# Patient Record
Sex: MALE | Race: WHITE | Employment: FULL TIME | URBAN - METROPOLITAN AREA
[De-identification: names, ages, dates, MRNs, and addresses within clinical notes are randomized per-mention and may not be internally consistent; named-entity substitution may affect disease eponyms.]

---

## 2019-11-19 ENCOUNTER — OFFICE VISIT (OUTPATIENT)
Dept: URGENT CARE | Facility: URGENT CARE | Age: 49
End: 2019-11-19
Payer: COMMERCIAL

## 2019-11-19 ENCOUNTER — ANCILLARY PROCEDURE (OUTPATIENT)
Dept: GENERAL RADIOLOGY | Facility: CLINIC | Age: 49
End: 2019-11-19
Attending: PREVENTIVE MEDICINE
Payer: COMMERCIAL

## 2019-11-19 ENCOUNTER — NURSE TRIAGE (OUTPATIENT)
Dept: NURSING | Facility: CLINIC | Age: 49
End: 2019-11-19

## 2019-11-19 VITALS
HEIGHT: 69 IN | WEIGHT: 175 LBS | SYSTOLIC BLOOD PRESSURE: 110 MMHG | DIASTOLIC BLOOD PRESSURE: 70 MMHG | HEART RATE: 113 BPM | BODY MASS INDEX: 25.92 KG/M2 | TEMPERATURE: 97.7 F | OXYGEN SATURATION: 96 %

## 2019-11-19 DIAGNOSIS — J10.1 INFLUENZA A: ICD-10-CM

## 2019-11-19 DIAGNOSIS — R52 ACHES: Primary | ICD-10-CM

## 2019-11-19 DIAGNOSIS — R05.9 COUGH: ICD-10-CM

## 2019-11-19 DIAGNOSIS — J18.9 PNEUMONIA DUE TO INFECTIOUS ORGANISM, UNSPECIFIED LATERALITY, UNSPECIFIED PART OF LUNG: ICD-10-CM

## 2019-11-19 LAB
FLUAV+FLUBV AG SPEC QL: NEGATIVE
FLUAV+FLUBV AG SPEC QL: POSITIVE
SPECIMEN SOURCE: ABNORMAL

## 2019-11-19 PROCEDURE — 71046 X-RAY EXAM CHEST 2 VIEWS: CPT

## 2019-11-19 PROCEDURE — 99203 OFFICE O/P NEW LOW 30 MIN: CPT

## 2019-11-19 PROCEDURE — 87804 INFLUENZA ASSAY W/OPTIC: CPT | Performed by: PREVENTIVE MEDICINE

## 2019-11-19 PROCEDURE — 87798 DETECT AGENT NOS DNA AMP: CPT | Performed by: PREVENTIVE MEDICINE

## 2019-11-19 RX ORDER — CEFPODOXIME PROXETIL 200 MG/1
200 TABLET, FILM COATED ORAL 2 TIMES DAILY
Qty: 14 TABLET | Refills: 0 | Status: SHIPPED | OUTPATIENT
Start: 2019-11-19 | End: 2019-11-26

## 2019-11-19 RX ORDER — ACETAMINOPHEN 325 MG/1
325-650 TABLET ORAL EVERY 6 HOURS PRN
COMMUNITY

## 2019-11-19 RX ORDER — IBUPROFEN 200 MG
200 TABLET ORAL EVERY 4 HOURS PRN
COMMUNITY

## 2019-11-19 RX ORDER — AZITHROMYCIN 250 MG/1
TABLET, FILM COATED ORAL
Qty: 6 TABLET | Refills: 0 | Status: SHIPPED | OUTPATIENT
Start: 2019-11-19 | End: 2019-11-24

## 2019-11-19 RX ORDER — OSELTAMIVIR PHOSPHATE 75 MG/1
75 CAPSULE ORAL 2 TIMES DAILY
Qty: 10 CAPSULE | Refills: 0 | Status: SHIPPED | OUTPATIENT
Start: 2019-11-19 | End: 2019-11-24

## 2019-11-19 RX ORDER — AZITHROMYCIN 250 MG/1
TABLET, FILM COATED ORAL
Qty: 6 TABLET | Refills: 0 | Status: SHIPPED | OUTPATIENT
Start: 2019-11-19 | End: 2019-11-19

## 2019-11-19 ASSESSMENT — MIFFLIN-ST. JEOR: SCORE: 1654.17

## 2019-11-19 NOTE — TELEPHONE ENCOUNTER
Michael was recently see at [facility] and asks for status of his prescription for the following medications: oseltamivir, cefpodoxime, azithromycin. States that [facility] was suppose to send the prescription to [facility], but pharmacy has not received it.    FNA reviewed medications and showed that these are local prints.     oseltamivir (TAMIFLU) 75 MG capsule 10 capsule 0 11/19/2019 11/24/2019 --   Sig - Route: Take 1 capsule (75 mg) by mouth 2 times daily for 5 days - Oral   Class: Local Print   Order: [id number]        Disp Refills Start End GONZALES   cefpodoxime (VANTIN) 200 MG tablet 14 tablet 0 11/19/2019 11/26/2019 --   Sig - Route: Take 1 tablet (200 mg) by mouth 2 times daily for 7 days - Oral   Class: Local Print   Order: [id number]      azithromycin (ZITHROMAX) 250 MG tablet 6 tablet 0 11/19/2019 11/24/2019 No   Sig - Route: Take 2 tablets (500 mg) by mouth daily for 1 day, THEN 1 tablet (250 mg) daily for 4 days. - Oral   Class: Local Print   Order: [id number]     All medications prescribed by Dr. Siegel.    FNA reached out to [facility], staff states that printed prescription were left at the urgent care. [facility] staff states that MD will e-prescribe to [facility].    FNA phoned patient back to update him of status. Patient verbalized understanding.    Leslie Leyva RN/[place] Nurse Advisor      Reason for Disposition    [1] Prescription not at pharmacy AND [2] was prescribed today by PCP    Protocols used: MEDICATION QUESTION CALL-A-

## 2019-11-19 NOTE — TELEPHONE ENCOUNTER
Pharmacy is calling to get the medications sent over from the clinic so they can fill them as the patient is waiting. I spoke with the previous nurse and the patient was given a paper copy of the prescriptions, that's why the pharmacy does not have them. Paper found left at urgent care. The Dr rebel mclaughlin prescribe the prescriptions to [facility]. I passed the message on to [facility].  No prescriptions showed up at [facility], so I gave a verbal order for Zithromax, Vantin, and Tamiflu to the pharmacist.    oseltamivir (TAMIFLU) 75 MG capsule 10 capsule 0 11/19/2019 11/24/2019 --   Sig - Route: Take 1 capsule (75 mg) by mouth 2 times daily for 5 days - Oral   Class: Local Print   Order: [id number]      Disp Refills Start End GONZALES   cefpodoxime (VANTIN) 200 MG tablet 14 tablet 0 11/19/2019 11/26/2019 --   Sig - Route: Take 1 tablet (200 mg) by mouth 2 times daily for 7 days - Oral   Class: Local Print   Order: [id number]     azithromycin (ZITHROMAX) 250 MG tablet 6 tablet 0 11/19/2019 11/24/2019 No   Sig - Route: Take 2 tablets (500 mg) by mouth daily for 1 day, THEN 1 tablet (250 mg) daily for 4 days. - Oral   Class: Local Print   Order: [id number]     All orders by Dr Aly Flores RN/ [place] [facility]        Reason for Disposition    [1] Prescription not at pharmacy AND [2] was prescribed today by PCP    Protocols used: MEDICATION QUESTION CALL-A-

## 2019-11-19 NOTE — PATIENT INSTRUCTIONS
ASSESSMENT:  Influenza A, Pneumonia   Tamiflu, Tylenol, Vantin, Z Pack  Follow up with pcp in 2-3 days for recheck  ED if symptoms worsen in the interim

## 2019-11-19 NOTE — PROGRESS NOTES
"SUBJECTIVE:  Michael Pack is a 49 year old male who presents to the clinic today with a chief complaint of cough  for 5 week(s).  His cough is described as persistent and productive green.    The patient's symptoms are moderate and worsening.  Associated symptoms include fever and nasal congestion. The patient's symptoms are exacerbated by no particular triggers  Patient has been using nothing  to improve symptoms.    No past medical history on file.    Current Outpatient Medications   Medication Sig Dispense Refill     acetaminophen (TYLENOL) 325 MG tablet Take 325-650 mg by mouth every 6 hours as needed for mild pain       ibuprofen (ADVIL/MOTRIN) 200 MG tablet Take 200 mg by mouth every 4 hours as needed for mild pain       oseltamivir (TAMIFLU) 75 MG capsule Take 1 capsule (75 mg) by mouth 2 times daily for 5 days 10 capsule 0     Pseudoephedrine-APAP-DM (DAYQUIL OR)          Social History     Tobacco Use     Smoking status: Never Smoker     Smokeless tobacco: Never Used   Substance Use Topics     Alcohol use: Not on file       ROS  INTEGUMENTARY/SKIN: NEGATIVE for worrisome rashes, moles or lesions  EYES: NEGATIVE for vision changes or irritation  CV: NEGATIVE for chest pain, palpitations or peripheral edema  GI: NEGATIVE for nausea, abdominal pain, heartburn, or change in bowel habits  MUSCULOSKELETAL: NEGATIVE for significant arthralgias or myalgia  NEURO: NEGATIVE for weakness, dizziness or paresthesias  ENDOCRINE: NEGATIVE for temperature intolerance, skin/hair changes    OBJECTIVE:  /70   Pulse 113   Temp 97.7  F (36.5  C) (Oral)   Ht 1.753 m (5' 9\")   Wt 79.4 kg (175 lb)   SpO2 96%   BMI 25.84 kg/m    GENERAL APPEARANCE: healthy, alert and no distress  EYES: EOMI,  PERRL, conjunctiva clear  HENT: ear canals and TM's normal.  Nose and mouth without ulcers, erythema or lesions  NECK: supple, nontender, no lymphadenopathy  RESP: lungs with crackles at bilateral bases  CV: regular rates and " rhythm, normal S1 S2, no murmur noted  ABDOMEN:  soft, nontender, no HSM or masses and bowel sounds normal  NEURO: Normal strength and tone, sensory exam grossly normal,  normal speech and mentation  SKIN: no suspicious lesions or rashes    CXR - r middle lobe infiltrate    ASSESSMENT:  Influenza A, Pneumonia   Tamiflu, Tylenol, Vantin, Z Pack  Follow up with pcp in 2-3 days for recheck  ED if symptoms worsen in the interim    PLAN:  See orders in Epic  Symptomatic measures encouraged, humidified air, plenty of fluids.

## 2019-11-20 LAB
B PARAPERT DNA SPEC QL NAA+PROBE: NOT DETECTED
B PERT DNA SPEC QL NAA+PROBE: NOT DETECTED
BORDETELLA COMMENT: NORMAL